# Patient Record
Sex: MALE
[De-identification: names, ages, dates, MRNs, and addresses within clinical notes are randomized per-mention and may not be internally consistent; named-entity substitution may affect disease eponyms.]

---

## 2021-12-22 ENCOUNTER — TRANSCRIPTION ENCOUNTER (OUTPATIENT)
Age: 61
End: 2021-12-22

## 2023-01-24 ENCOUNTER — APPOINTMENT (OUTPATIENT)
Dept: UROLOGY | Facility: CLINIC | Age: 63
End: 2023-01-24
Payer: COMMERCIAL

## 2023-01-24 VITALS
SYSTOLIC BLOOD PRESSURE: 122 MMHG | OXYGEN SATURATION: 96 % | TEMPERATURE: 98 F | HEART RATE: 83 BPM | BODY MASS INDEX: 24.92 KG/M2 | DIASTOLIC BLOOD PRESSURE: 71 MMHG | HEIGHT: 71 IN | WEIGHT: 178 LBS

## 2023-01-24 DIAGNOSIS — Z80.3 FAMILY HISTORY OF MALIGNANT NEOPLASM OF BREAST: ICD-10-CM

## 2023-01-24 DIAGNOSIS — Z78.9 OTHER SPECIFIED HEALTH STATUS: ICD-10-CM

## 2023-01-24 DIAGNOSIS — Z86.39 PERSONAL HISTORY OF OTHER ENDOCRINE, NUTRITIONAL AND METABOLIC DISEASE: ICD-10-CM

## 2023-01-24 DIAGNOSIS — N40.1 BENIGN PROSTATIC HYPERPLASIA WITH LOWER URINARY TRACT SYMPMS: ICD-10-CM

## 2023-01-24 PROBLEM — Z00.00 ENCOUNTER FOR PREVENTIVE HEALTH EXAMINATION: Status: ACTIVE | Noted: 2023-01-24

## 2023-01-24 LAB
BILIRUB UR QL STRIP: NORMAL
CLARITY UR: CLEAR
COLLECTION METHOD: NORMAL
GLUCOSE UR-MCNC: NORMAL
HCG UR QL: 0.2 EU/DL
HGB UR QL STRIP.AUTO: NORMAL
KETONES UR-MCNC: NORMAL
LEUKOCYTE ESTERASE UR QL STRIP: NORMAL
NITRITE UR QL STRIP: NORMAL
PH UR STRIP: 6.5
PROT UR STRIP-MCNC: NORMAL
SP GR UR STRIP: <=1.005

## 2023-01-24 PROCEDURE — 99204 OFFICE O/P NEW MOD 45 MIN: CPT

## 2023-01-24 PROCEDURE — 81003 URINALYSIS AUTO W/O SCOPE: CPT | Mod: QW

## 2023-01-24 PROCEDURE — 51798 US URINE CAPACITY MEASURE: CPT

## 2023-01-24 RX ORDER — SIMVASTATIN 20 MG/1
20 TABLET, FILM COATED ORAL
Refills: 0 | Status: ACTIVE | COMMUNITY

## 2023-01-24 NOTE — HISTORY OF PRESENT ILLNESS
[FreeTextEntry1] : 62 yr old male presents with urinary frequency x few years. Referred by MARISOL Gentile. Gradually worsening. Sometimes stream is slow, but for most part is strong. At the end of the stream has some mild intermittency, mild urgency. No pushing, straining, incontinence. Nocturia x0-1. He urinates usually every 3 hours. Denies dysuria or gross hematuria. Overall, not too bothered by urination. \par \par PSAs:\par 1/6/23-- 1.630; \par \par Cr: 0.84 \par \par PVR: 40 ml \par UA: neg \par IPSS: 13, QOL: 3 \par \par Drinks: 3 caffeinated teas,  occasional coffee, water \par \par \par

## 2023-01-24 NOTE — ASSESSMENT
[FreeTextEntry1] : 62 yr old male presents with BPH with moderate LUTS. PSA is in normal range. \par \par I spent this consultation with discussing the causes, sequelae, and management of an enlarged prostate. I explained that enlargement of the prostate is common among men and there is increasing prevalence among older men. The severity of symptoms of an enlarged prostate, however, can vary widely. These symptoms are often "obstructive," characterized by weak force of stream, straining, and hesitancy, due to the impediment to bladder emptying. Additionally, over time, the bladder itself may change becoming thick-walled, less compliant, overactive with a lower capacity resulting in the "irritative" symptoms of urinary frequency and urgency. In the long term, the bladder muscles can start to become weaker and lead to the inability to empty the bladder and the inability to urinate at all. \par \par Medical management with alpha-blockers and/or tadalafil, which facilitates bladder emptying, is the first-line therapy. Furthermore, 5-alpha reductase inhibitors to shrink the prostate and/or anticholinergics to relax the bladder may also be added. Indications for surgical intervention include urinary retention, urinary tract infection, evidence of uropathy and worsening renal function, bladder stones, and gross hematuria.\par \par Patient elects to proceed with conservative management at this time with lifestyle and dietary modifications. If his symptoms worsen or become more bothersome, he can return as needed. Otherwise, follow up in 1 yr. \par \par - Behavioral modifications: limit fluid intake prior to bedtime, limit caffeine and alcohol intake\par - continue yearly PSA screen\par - RTC 1 year \par

## 2023-01-24 NOTE — LETTER BODY
[Dear  ___] : Dear  [unfilled], [Courtesy Letter:] : I had the pleasure of seeing your patient, [unfilled], in my office today. [Please see my note below.] : Please see my note below. [Consult Closing:] : Thank you very much for allowing me to participate in the care of this patient.  If you have any questions, please do not hesitate to contact me. [Sincerely,] : Sincerely, [FreeTextEntry3] : Radha Boss MD

## 2023-01-24 NOTE — PHYSICAL EXAM
[General Appearance - Well Developed] : well developed [General Appearance - Well Nourished] : well nourished [Normal Appearance] : normal appearance [Well Groomed] : well groomed [General Appearance - In No Acute Distress] : no acute distress [Edema] : no peripheral edema [Respiration, Rhythm And Depth] : normal respiratory rhythm and effort [Exaggerated Use Of Accessory Muscles For Inspiration] : no accessory muscle use [Abdomen Soft] : soft [Abdomen Tenderness] : non-tender [Costovertebral Angle Tenderness] : no ~M costovertebral angle tenderness [Normal Station and Gait] : the gait and station were normal for the patient's age [] : no rash [No Focal Deficits] : no focal deficits [Oriented To Time, Place, And Person] : oriented to person, place, and time [Affect] : the affect was normal [Mood] : the mood was normal [Not Anxious] : not anxious [Urethral Meatus] : meatus normal [Urinary Bladder Findings] : the bladder was normal on palpation [Scrotum] : the scrotum was normal [Testes Mass (___cm)] : there were no testicular masses [No Prostate Nodules] : no prostate nodules [Prostate Size ___ gm] : prostate size [unfilled] gm